# Patient Record
Sex: FEMALE | Race: WHITE | NOT HISPANIC OR LATINO | Employment: FULL TIME | URBAN - METROPOLITAN AREA
[De-identification: names, ages, dates, MRNs, and addresses within clinical notes are randomized per-mention and may not be internally consistent; named-entity substitution may affect disease eponyms.]

---

## 2023-10-23 ENCOUNTER — APPOINTMENT (OUTPATIENT)
Dept: OTOLARYNGOLOGY | Facility: CLINIC | Age: 38
End: 2023-10-23
Payer: COMMERCIAL

## 2023-12-11 ENCOUNTER — OFFICE VISIT (OUTPATIENT)
Dept: OTOLARYNGOLOGY | Facility: CLINIC | Age: 38
End: 2023-12-11
Payer: COMMERCIAL

## 2023-12-11 VITALS — TEMPERATURE: 97.5 F | WEIGHT: 145 LBS | BODY MASS INDEX: 24.13 KG/M2

## 2023-12-11 DIAGNOSIS — H90.41 SENSORINEURAL HEARING LOSS (SNHL) OF RIGHT EAR WITH UNRESTRICTED HEARING OF LEFT EAR: Primary | ICD-10-CM

## 2023-12-11 DIAGNOSIS — Z96.21 COCHLEAR IMPLANT IN PLACE: ICD-10-CM

## 2023-12-11 PROCEDURE — 1036F TOBACCO NON-USER: CPT | Performed by: OTOLARYNGOLOGY

## 2023-12-11 PROCEDURE — 99213 OFFICE O/P EST LOW 20 MIN: CPT | Performed by: OTOLARYNGOLOGY

## 2023-12-11 RX ORDER — BISMUTH SUBSALICYLATE 262 MG
1 TABLET,CHEWABLE ORAL DAILY
COMMUNITY

## 2023-12-11 RX ORDER — ACETAMINOPHEN 500 MG
TABLET ORAL DAILY
COMMUNITY

## 2023-12-11 ASSESSMENT — PATIENT HEALTH QUESTIONNAIRE - PHQ9: 2. FEELING DOWN, DEPRESSED OR HOPELESS: NOT AT ALL

## 2023-12-11 NOTE — LETTER
December 19, 2023     SHE Orozco, CCC-A  [address] [facility]  Audiology Services, [address]  [facility] [address]    Patient: Michelle Reyes   YOB: 1985   Date of Visit: 12/11/2023       Dear Dr. Gabriela Westbrook, SHE, CCC-A:    Thank you for referring Michelle Reyes to me for evaluation. Below are my notes for this consultation.  If you have questions, please do not hesitate to call me. I look forward to following your patient along with you.       Sincerely,     Agustin Hermosillo MD      CC: Levi Newman MD  ______________________________________________________________________________________            Reason for Consult:  Follow-up     Subjective  History Of Present Illness:  Michelle Reyes is a 38 y.o. female with right SSD and s/p BAHA placement in 2013 by Dr. Escalante which is not providing her benefit. For that reason, she decided to move forward with a right-sided cochlear implantation, which we did after removing the abutment on 11/2021.     During surgery, i encountered:  1. Well aerated mastoid.   2. Facial nerve intact in normal position.   3. Chorda tympani intact   4. Round Window approach.   5. Implant/ electrode: Cochlear  placed.   6. Unfavorable anatomy.   7. Insertion:   - Very smooth full insertion.   - Speed: 30-60sec   - 2 markers out of round window   - No resistance.   8. Neural response telemetry: good response   9. Xray: Good position.      Since surgery, she is doing well and does not have any problems. She is very happy with her CI.     Past Medical History:  She has no past medical history on file.    Surgical History:  She has a past surgical history that includes Other surgical history (08/14/2018) and Other surgical history (08/14/2018).     Social History:  She reports that she has never smoked. She has never used smokeless tobacco. No history on file for alcohol use and drug use.    Family History:  family history is not on  file.     Medications:  Current Outpatient Medications   Medication Instructions   • cetirizine (ZYRTEC) 10 mg capsule oral, Daily   • cholecalciferol (Vitamin D3) 50 mcg (2,000 unit) capsule oral, Daily   • L. acidophilus/Bifid. animalis 32 billion cell capsule oral   • multivitamin tablet 1 tablet, oral, Daily   • naproxen sodium/pseudoephedrin (ALEVE-D SINUS AND COLD ORAL) oral      Allergies:  Patient has no known allergies.    Review of Systems:   A comprehensive 10-point review of systems was obtained including constitutional, neurological, HEENT, pulmonary, cardiovascular, genito-urinary, and other pertinent systems and was negative except as noted in the HPI.     Objective  Physical Exam:  Last Recorded Vitals: Temperature 36.4 °C (97.5 °F), weight 65.8 kg (145 lb).    On physical exam, the patient is a well-nourished, well-developed patient, in no acute distress, able to communicate without assistance in English language. Head and face is atraumatic and normocephalic. Salivary glands are intact. Facial strength is symmetrical bilaterally.       On ear examination:  Right ear: The patient has an open and patent ear canal. The tympanic membrane is intact. The abutment site has completely healed. The  stimulator looks perfectly places. The incision looks clear and intact.   AC>BC  Left ear: The patient has an open and patent ear canal. The tympanic membrane is intact.  AC>BC  The Ward is left    On vestibular exam, the patient has no spontaneous nystagmus, no headshake nystagmus, no head-thrust nystagmus, and no nystagmus on hyperventilation or Valsalva maneuvers. Robbie-Hallpike maneuver is negative bilaterally.       On neuro exam, the patient is alert and oriented x3, cranial nerves are grossly intact, cerebellar exam is normal.      The rest of the exam, including anterior rhinoscopy, oropharyngeal exam, neck exam, and cardiovascular exam, were normal including no palpable lymphadenopathies, thyroid  in the midline position, normal pulses, and normal chest excursion.       Reviewed Results:  Audiology Testing:  I personally reviewed her last programming session from 08/2023 that showed 76% speech understanding on CNC scores and 91% in AZ Bio in quiet.     I reviewed the patient's audiogram March 2021 which shows profound right sided SNHL. The discrimination is 0% on right side.     I reviewed her last cochlear implant programming session that showed significant improvement in her speech understanding up too 84% on CNC and 97% AZBIO.     Imaging:  I reviewed and interpreted the patient's CT scan today which showed normal mastoid and cochlear anatomy.     I reviewed and interpreted the patient's prior MRI report which shows no evidence of retrocochlear pathology.     Cochlear Implant Evaluation:  I reviewed and interpreted her CI evaluation which showed CNC scores and AZbio scores of 0%. Hearing aids are not good enough for benefit on right ear. She's a good CI candidate on right ear.      Procedure:  None    Assessment/Plan  In summary, Michelle Reyes is a 38 y.o. female with right SSD and s/p BAHA placement in 2013 by Dr. Escalante which is not providing her benefit.     She is now s/p removal of the BAHA abutment and right-sided cochlear implantation with a  electrode.    Her speech understanding on the right is now 80% on CNC scores and 91% on AZ Bio.    Overall, she is doing fantastic. She is going to continue to follow-up with audiology. I will see her back as needed.      Scribe Attestation  By signing my name below, I, Nati Lozano , Scribe   attest that this documentation has been prepared under the direction and in the presence of Agustin Hermosillo MD.   ____________________________________________________  Agustin Chao MD  Professor and Chief   Otology/Neurotology/Lateral Skull-Base Surgery   [facility]

## 2023-12-11 NOTE — PROGRESS NOTES
Reason for Consult:  Follow-up     Subjective   History Of Present Illness:  Michelle Reyes is a 38 y.o. female with right SSD and s/p BAHA placement in 2013 by Dr. Escalante which is not providing her benefit. For that reason, she decided to move forward with a right-sided cochlear implantation, which we did after removing the abutment on 11/2021.     During surgery, i encountered:  1. Well aerated mastoid.   2. Facial nerve intact in normal position.   3. Chorda tympani intact   4. Round Window approach.   5. Implant/ electrode: Cochlear  placed.   6. Unfavorable anatomy.   7. Insertion:   - Very smooth full insertion.   - Speed: 30-60sec   - 2 markers out of round window   - No resistance.   8. Neural response telemetry: good response   9. Xray: Good position.      Since surgery, she is doing well and does not have any problems. She is very happy with her CI.     Past Medical History:  She has no past medical history on file.    Surgical History:  She has a past surgical history that includes Other surgical history (08/14/2018) and Other surgical history (08/14/2018).     Social History:  She reports that she has never smoked. She has never used smokeless tobacco. No history on file for alcohol use and drug use.    Family History:  family history is not on file.     Medications:  Current Outpatient Medications   Medication Instructions    cetirizine (ZYRTEC) 10 mg capsule oral, Daily    cholecalciferol (Vitamin D3) 50 mcg (2,000 unit) capsule oral, Daily    L. acidophilus/Bifid. animalis 32 billion cell capsule oral    multivitamin tablet 1 tablet, oral, Daily    naproxen sodium/pseudoephedrin (ALEVE-D SINUS AND COLD ORAL) oral      Allergies:  Patient has no known allergies.    Review of Systems:   A comprehensive 10-point review of systems was obtained including constitutional, neurological, HEENT, pulmonary, cardiovascular, genito-urinary, and other pertinent systems and was negative except as  noted in the HPI.     Objective   Physical Exam:  Last Recorded Vitals: Temperature 36.4 °C (97.5 °F), weight 65.8 kg (145 lb).    On physical exam, the patient is a well-nourished, well-developed patient, in no acute distress, able to communicate without assistance in English language. Head and face is atraumatic and normocephalic. Salivary glands are intact. Facial strength is symmetrical bilaterally.       On ear examination:  Right ear: The patient has an open and patent ear canal. The tympanic membrane is intact. The abutment site has completely healed. The  stimulator looks perfectly places. The incision looks clear and intact.   AC>BC  Left ear: The patient has an open and patent ear canal. The tympanic membrane is intact.  AC>BC  The Ward is left    On vestibular exam, the patient has no spontaneous nystagmus, no headshake nystagmus, no head-thrust nystagmus, and no nystagmus on hyperventilation or Valsalva maneuvers. Robbie-Hallpike maneuver is negative bilaterally.       On neuro exam, the patient is alert and oriented x3, cranial nerves are grossly intact, cerebellar exam is normal.      The rest of the exam, including anterior rhinoscopy, oropharyngeal exam, neck exam, and cardiovascular exam, were normal including no palpable lymphadenopathies, thyroid in the midline position, normal pulses, and normal chest excursion.       Reviewed Results:  Audiology Testing:  I personally reviewed her last programming session from 08/2023 that showed 76% speech understanding on CNC scores and 91% in AZ Bio in quiet.     I reviewed the patient's audiogram March 2021 which shows profound right sided SNHL. The discrimination is 0% on right side.     I reviewed her last cochlear implant programming session that showed significant improvement in her speech understanding up too 84% on CNC and 97% AZBIO.     Imaging:  I reviewed and interpreted the patient's CT scan today which showed normal mastoid and cochlear  anatomy.     I reviewed and interpreted the patient's prior MRI report which shows no evidence of retrocochlear pathology.     Cochlear Implant Evaluation:  I reviewed and interpreted her CI evaluation which showed CNC scores and AZbio scores of 0%. Hearing aids are not good enough for benefit on right ear. She's a good CI candidate on right ear.      Procedure:  None    Assessment/Plan   In summary, Michelle Reyes is a 38 y.o. female with right SSD and s/p BAHA placement in 2013 by Dr. Escalante which is not providing her benefit.     She is now s/p removal of the BAHA abutment and right-sided cochlear implantation with a  electrode.    Her speech understanding on the right is now 80% on CNC scores and 91% on AZ Bio.    Overall, she is doing fantastic. She is going to continue to follow-up with audiology. I will see her back as needed.      Scribe Attestation  By signing my name below, I, Nati Lozano , Scribe   attest that this documentation has been prepared under the direction and in the presence of Agustin Hermosillo MD.   ____________________________________________________  Agustin Chao MD  Professor and Chief   Otology/Neurotology/Lateral Skull-Base Surgery   [facility]

## 2024-09-09 ENCOUNTER — APPOINTMENT (OUTPATIENT)
Dept: AUDIOLOGY | Facility: CLINIC | Age: 39
End: 2024-09-09
Payer: COMMERCIAL

## 2025-01-20 ENCOUNTER — APPOINTMENT (OUTPATIENT)
Dept: AUDIOLOGY | Facility: CLINIC | Age: 40
End: 2025-01-20
Payer: COMMERCIAL

## 2025-02-10 ENCOUNTER — CLINICAL SUPPORT (OUTPATIENT)
Dept: AUDIOLOGY | Facility: CLINIC | Age: 40
End: 2025-02-10
Payer: COMMERCIAL

## 2025-02-10 DIAGNOSIS — Z96.21 COCHLEAR IMPLANT IN PLACE: ICD-10-CM

## 2025-02-10 DIAGNOSIS — H90.41 SENSORINEURAL HEARING LOSS (SNHL) OF RIGHT EAR WITH UNRESTRICTED HEARING OF LEFT EAR: Primary | ICD-10-CM

## 2025-02-10 PROCEDURE — 92604 REPROGRAM COCHLEAR IMPLT 7/>: CPT | Performed by: AUDIOLOGIST

## 2025-02-10 PROCEDURE — 92626 EVAL AUD FUNCJ 1ST HOUR: CPT | Mod: 59 | Performed by: AUDIOLOGIST

## 2025-02-10 ASSESSMENT — PAIN SCALES - GENERAL: PAINLEVEL_OUTOF10: 0 - NO PAIN

## 2025-02-10 ASSESSMENT — PAIN - FUNCTIONAL ASSESSMENT: PAIN_FUNCTIONAL_ASSESSMENT: 0-10

## 2025-02-10 NOTE — PROGRESS NOTES
Chief Complaint  cochlear implant programing and stimulation     History of Present Illness  Michelle Reyes was seen for the stimulation and programming of her Nucleus  cochlear implant at the right ear, used in conjunction with the [id number] ear level speech processor. She presents with a unilateral sensorineural hearing loss in the right ear of unknown etiology. Ms. Reyes underwent the surgical implantation of the  cochlear implant at the right ear on 11/11/21. This was following explant of previous Baha implant due to lack of benefit. Post operative course remains unremarkable. Please see medical records for further medical history. Ms. Reyes uses the processor on a somewhat part-time basis, as she reports when she is home or active with her kids she takes it off. Overall feels she is doing well. She met with Lesli Rahman, TIERA and worked on auditory training program via direct streaming. Ms. Reyes denies any pain.     Results/Data  Impedances were evaluated using the ear level [id number] speech processor for intra-cochlear electrodes 1-22 in common ground (CG), Monopolar 1 (MP1), Monopolar 2 (MP2) and MP1+2 stimulation modes. Satisfactory impedances were found for all electrodes, in each stimulation mode.   The [id number] processor ([id number] ) remains programmed in an MP1+2 stimulation mode using an ACE speech processing strategy, 8 maxima and a 900 Hz stimulation rate. Electrodes 1-22 remain activated. Using standard audiometric technique, a psychophysical map was created with good reliability. No facial nerve stimulation was observed. T- and C-levels remained essentially stable. Created MAP with FAT adjustment of LF cutoff raised to 438 Hz. Initially reported this MAP sounded tinny and high pitched, but will plan to give it a try. Maps were created and programmed as follows:     P1=SCAN  P2=CAFE  P3=GROUPS  P4=SCAN w/ 438 Hz FAT adjustment     Datalogging = 3.3 hrs/day in P1 SCAN program only does  not wear when exercising, home alone, active with kids, etc. Reports mainly only wearing at work.     Functional gain was assessed while patient wore the [id number] processor in P1 following reprogramming. Responses to narrowband noise ranged from 35-40 dBHL for 500-4000 Hz. Word discrimination was assessed using CNC words and AzBio sentences presented at 50 dBHL via recorded text. A score of 52% correct was obtained for CNC words and a score of 88% correct was obtained for AzBio sentences in quiet. A score of 19% correct was obtained for AzBio sentences in 40 dB competing speech babble. Testing was completed while contralateral left ear was plugged and muffed.    Patient Discussion/Summary     Positive stimulation was obtained on electrodes 1-22. A reliable psychophysical map was defined in the ACE speech processing strategy. Appropriate behavior was observed when the Maps were tested. Ms. Reyes was encouraged to devote a portion of each day to rehab activities while using an earplug in the contralateral normal hearing left ear or via direct streaming (preferred). Performance verification reveals good aided benefit from the cochlear implant system. Decline in word discrimination may be attributed to reduced wear time of processor.     TREATMENT PLAN  1.Utilize the Nucleus  Cochlear Implant System and the [id number] speech processor daily using the most tolerated program.  2.Return for remapping and optimization of the [id number] speech processor as scheduled in one year in order to optimize the psychophysical ACE map.  3.Utilize aural rehabilitation/auditory training techniques at home to improve speech understanding ability. Continue formal auditory training through this facility as directed.  4.       Return to Dr. Chao as directed for medical management.      [phone]    Jojo Armstrong, CCC-A